# Patient Record
Sex: MALE | Race: WHITE | NOT HISPANIC OR LATINO | Employment: OTHER | URBAN - METROPOLITAN AREA
[De-identification: names, ages, dates, MRNs, and addresses within clinical notes are randomized per-mention and may not be internally consistent; named-entity substitution may affect disease eponyms.]

---

## 2017-01-04 PROBLEM — M21.371 RIGHT FOOT DROP: Status: ACTIVE | Noted: 2017-01-04

## 2017-01-09 ENCOUNTER — TELEPHONE (OUTPATIENT)
Dept: PODIATRY | Facility: CLINIC | Age: 72
End: 2017-01-09

## 2017-01-09 ENCOUNTER — TELEPHONE (OUTPATIENT)
Dept: NEUROLOGY | Facility: CLINIC | Age: 72
End: 2017-01-09

## 2017-01-09 NOTE — TELEPHONE ENCOUNTER
----- Message from Sera Nagy sent at 1/9/2017 10:20 AM CST -----  Patient is being referred for right foot(stated cannot lift right foot)needs soon appointment/please call patient back at [phone] to schedule or advise.

## 2017-01-10 NOTE — TELEPHONE ENCOUNTER
Spoke with patient, referral in the system. Patient placed on call list, will call back to schedule . Verbalized understanding.

## 2017-01-20 ENCOUNTER — TELEPHONE (OUTPATIENT)
Dept: NEUROLOGY | Facility: CLINIC | Age: 72
End: 2017-01-20

## 2017-01-26 ENCOUNTER — TELEPHONE (OUTPATIENT)
Dept: NEUROLOGY | Facility: CLINIC | Age: 72
End: 2017-01-26

## 2017-01-26 ENCOUNTER — OFFICE VISIT (OUTPATIENT)
Dept: PODIATRY | Facility: CLINIC | Age: 72
End: 2017-01-26
Payer: MEDICARE

## 2017-01-26 DIAGNOSIS — I73.9 PERIPHERAL VASCULAR DISEASE: ICD-10-CM

## 2017-01-26 DIAGNOSIS — M21.371 RIGHT FOOT DROP: Primary | ICD-10-CM

## 2017-01-26 PROCEDURE — 1159F MED LIST DOCD IN RCRD: CPT | Mod: S$GLB,,,

## 2017-01-26 PROCEDURE — 99999 PR PBB SHADOW E&M-EST. PATIENT-LVL II: CPT | Mod: PBBFAC,,,

## 2017-01-26 PROCEDURE — 1160F RVW MEDS BY RX/DR IN RCRD: CPT | Mod: S$GLB,,,

## 2017-01-26 PROCEDURE — 1126F AMNT PAIN NOTED NONE PRSNT: CPT | Mod: S$GLB,,,

## 2017-01-26 PROCEDURE — 99203 OFFICE O/P NEW LOW 30 MIN: CPT | Mod: S$GLB,,,

## 2017-01-26 PROCEDURE — 1157F ADVNC CARE PLAN IN RCRD: CPT | Mod: S$GLB,,,

## 2017-01-26 NOTE — TELEPHONE ENCOUNTER
Angella - can we please schedule him for the morning on Wed, Feb 1? I'm at [facility] but can be here first to see him.  Thanks  Ruiz

## 2017-01-26 NOTE — LETTER
January 26, 2017      SHANNON Osei Jr., MD  [address]  [address]  [facility] [address]           [facility] Podiat[facility] [address] [facility] [address] [place] [address]  Phone: [phone]          Patient: Robert Tai   MR Number: [id number]   YOB: 1945   Date of Visit: 1/26/2017       Dear Dr. SHANNON Osei Jr.:    Thank you for referring Robert Tai to me for evaluation. Attached you will find relevant portions of my assessment and plan of care.    If you have questions, please do not hesitate to call me. I look forward to following Robert Tai along with you.    Sincerely,    Dov Chapman, CINDI    Enclosure  CC:  No Recipients    If you would like to receive this communication electronically, please contact [email or web address] or [phone] to request more information on [employer] Link access.    For providers and/or their staff who would like to refer a patient to [facility], please contact us through our one-stop-shop provider referral line, [facility] , at [phone].    If you feel you have received this communication in error or would no longer like to receive these types of communications, please e-mail [email or web address]

## 2017-01-26 NOTE — TELEPHONE ENCOUNTER
----- Message from Dov Chapman DPM sent at 1/26/2017  2:54 PM CST -----  Patient was asking if he could be seen earlier than February 21. He has a right foot drop.  No trauma or CVA, denies LBP or other neurological history.  I have ordered emg/ncv, PT, AFO.    Dr. Chapman

## 2017-01-26 NOTE — MR AVS SNAPSHOT
[facility]  [address] [facility]  [place] [address]  Phone: [phone]                  Robert Tai   2017 2:15 PM   Office Visit    Description:  Male : 1945   Provider:  Dov Chapman DPM   Department:  [facility]           Reason for Visit     Foot Problem     Last PCP-visit           Diagnoses this Visit        Comments    Right foot drop    -  Primary            To Do List           Future Appointments        Provider Department Dept Phone    2017 1:00 PM Dov Hodges DO [facility] Neuorology [phone]    3/9/2017 9:00 AM Freddy Herrera MD [facility] - Physical Med/Rehab [phone]      Goals (5 Years of Data)     None      [facility]     [facility] On Call Nurse Care Line -  Assistance  Registered nurses in the [facility] On Call Center provide clinical advisement, health education, appointment booking, and other advisory services.  Call for this free service at [phone].             Medications           Message regarding Medications     Verify the changes and/or additions to your medication regime listed below are the same as discussed with your clinician today.  If any of these changes or additions are incorrect, please notify your healthcare provider.             Verify that the below list of medications is an accurate representation of the medications you are currently taking.  If none reported, the list may be blank. If incorrect, please contact your healthcare provider. Carry this list with you in case of emergency.           Current Medications     amiodarone (PACERONE) 200 MG Tab Take 1 tablet (200 mg total) by mouth once daily.    atorvastatin (LIPITOR) 40 MG tablet Take 40 mg by mouth once daily.    clopidogrel (PLAVIX) 75 mg tablet Take 1 tablet (75 mg total) by mouth once daily.    ELIQUIS 5 mg Tab TAKE 1 TABLET TWICE DAILY    furosemide (LASIX) 20 MG tablet Take 20 mg by mouth 2 (two) times daily.    hydrALAZINE  (APRESOLINE) 25 MG tablet Take 25 mg by mouth every 12 (twelve) hours.    isosorbide dinitrate (ISORDIL) 20 MG tablet Take 1 tablet (20 mg total) by mouth 2 (two) times daily.    metoprolol succinate (TOPROL-XL) 25 MG 24 hr tablet Take 1 tablet (25 mg total) by mouth every evening.    metoprolol succinate (TOPROL-XL) 50 MG 24 hr tablet Take 1 tablet (50 mg total) by mouth every morning.    montelukast (SINGULAIR) 10 mg tablet Take 10 mg by mouth every evening.     sertraline (ZOLOFT) 50 MG tablet Take 1 tablet (50 mg total) by mouth once daily.    tamsulosin (FLOMAX) 0.4 mg Cp24 TAKE 1 CAPSULE AT BEDTIME  FOR  PROSTATE           Clinical Reference Information           Allergies as of 1/26/2017     No Known Allergies      Immunizations Administered on Date of Encounter - 1/26/2017     None      Orders Placed During Today's Visit     Future Labs/Procedures Expected by Expires    EMG - 2 Extremities  As directed 1/26/2018    Nerve conduction test  As directed 1/26/2018      [facility] Sign-Up     Activating your [facility] account is as easy as 1-2-3!     1) Visit [email or web address], select Sign Up Now, enter this activation code and your date of birth, then select Next.  [email or web address]  Expires: 2/18/2017  3:37 PM      2) Create a username and password to use when you visit [facility] in the future and select a security question in case you lose your password and select Next.    3) Enter your e-mail address and click Sign Up!    Additional Information  If you have questions, please e-mail [email or web address] or call [phone] to talk to our [facility] staff. Remember, [facility] is NOT to be used for urgent needs. For medical emergencies, dial 911.

## 2017-01-29 PROBLEM — I73.9 PERIPHERAL VASCULAR DISEASE: Status: ACTIVE | Noted: 2017-01-29

## 2017-01-30 NOTE — PROGRESS NOTES
Subjective:       Patient ID: Robert Tai is a 71 y.o. male.    Chief Complaint: Foot Problem (Rt Drop foot , pt states that his leg also feels numb when he wakes up ) and Last PCP-visit (PCP: Farnaz 1/4/17 )    HPI  Patient presents with 6 weeks of weakness right foot, he can't lift the foot.  Onset was acute.  No trauma, previous knee replacement 5-6 years ago.  No other neurological problems.    Past Medical History   Diagnosis Date    Anticoagulant long-term use     Atrial fibrillation 1/20/2016    Cardiomyopathy 2/18/2016    CHF (congestive heart failure)     COPD (chronic obstructive pulmonary disease)     Coronary artery disease     Dyslipidemia 1/20/2016    Elevated diaphragm      elevated left hemidiaphragm noted since 2008    Essential hypertension 1/20/2016    Low kidney function     Obesity 1/20/2016    S/P CABG (coronary artery bypass graft) 1/20/2016     2008 at [facility] (LIMA to LAD, SVG to D1, jump SVG to OM1 and OM2, SVG to R PDA)       Review of Systems  ROS:  Constitution: Negative for chills, fever, weakness and malaise/fatigue.   HEENT: Negative for headaches.   Cardiovascular: Negative for chest pain and claudication.   Respiratory: Negative for cough and shortness of breath.   Musculoskeletal: Positive for foot pain.  Negative for muscle cramps and muscle weakness.   Gastrointestinal: Negative for nausea and vomiting.   Neurological: + for weakness to right foot extensors  Dermatological: Negative for wound.        Objective:      Physical Exam  Constitutional:  Patient is oriented to person, place, and time. Vital signs are normal.  Appears well-developed and well-nourished.     Vascular:  Dorsalis pedis pulses are 1+ on the right side, and 1+ on the left side.   Posterior tibial pulses are 1+ on the right side, and 1+ on the left side.   + digital hair growth, capillary fill time to all toes <3 seconds, no swelling    Skin/Dermatological:  Skin is warm and intact.  No cyanosis or  clubbing.  No rashes noted.  No open wounds.   Musculoskeletal:      Right foot drop, hip flexors intact, foot does clear the ground on gait exam.  Flexors/everters/inverters +5/5       Neurological:  No spasticity noted  + deficits to sharp/dull, light touch or vibratory sensation along the common peroneal nerve right foot  Patellar reflexes are 2+ on the right side and 2+ on the left side.  Achilles reflexes are 2+ on the right side and 2+ on the left side.          Assessment:       1. Right foot drop    2. Peripheral vascular disease        Plan:       Right foot drop  -     EMG - 2 Extremities; Future  -     Nerve conduction test; Future    Peripheral vascular disease      Patient has appointment to see neurology.  Appears to be injury to common peroneal and not an UMN lesion or muscular in origin.  HE will complete the EMG/NCV and I did order an AFO and referred him to PT.  We did discuss tendon transfers if all fails.  We also discussed risk factors of PVD and importance of proper diet and exercise.

## 2017-02-21 ENCOUNTER — OFFICE VISIT (OUTPATIENT)
Dept: NEUROLOGY | Facility: CLINIC | Age: 72
End: 2017-02-21
Payer: MEDICARE

## 2017-02-21 VITALS
WEIGHT: 198.63 LBS | HEART RATE: 53 BPM | HEIGHT: 71 IN | BODY MASS INDEX: 27.81 KG/M2 | DIASTOLIC BLOOD PRESSURE: 58 MMHG | SYSTOLIC BLOOD PRESSURE: 103 MMHG | RESPIRATION RATE: 17 BRPM

## 2017-02-21 DIAGNOSIS — M21.371 RIGHT FOOT DROP: ICD-10-CM

## 2017-02-21 DIAGNOSIS — M21.371 FOOT DROP, RIGHT: ICD-10-CM

## 2017-02-21 DIAGNOSIS — M21.379 FOOT-DROP, UNSPECIFIED LATERALITY: Primary | ICD-10-CM

## 2017-02-21 DIAGNOSIS — G57.01 COMPRESSION OF COMMON PERONEAL NERVE, RIGHT: ICD-10-CM

## 2017-02-21 DIAGNOSIS — G57.01 COMPRESSION OF COMMON PERONEAL NERVE, RIGHT: Primary | ICD-10-CM

## 2017-02-21 PROCEDURE — 3074F SYST BP LT 130 MM HG: CPT | Mod: S$GLB,,, | Performed by: PSYCHIATRY & NEUROLOGY

## 2017-02-21 PROCEDURE — 1157F ADVNC CARE PLAN IN RCRD: CPT | Mod: S$GLB,,, | Performed by: PSYCHIATRY & NEUROLOGY

## 2017-02-21 PROCEDURE — 1159F MED LIST DOCD IN RCRD: CPT | Mod: S$GLB,,, | Performed by: PSYCHIATRY & NEUROLOGY

## 2017-02-21 PROCEDURE — 99203 OFFICE O/P NEW LOW 30 MIN: CPT | Mod: S$GLB,,, | Performed by: PSYCHIATRY & NEUROLOGY

## 2017-02-21 PROCEDURE — 3078F DIAST BP <80 MM HG: CPT | Mod: S$GLB,,, | Performed by: PSYCHIATRY & NEUROLOGY

## 2017-02-21 PROCEDURE — 99999 PR PBB SHADOW E&M-EST. PATIENT-LVL IV: CPT | Mod: PBBFAC,,, | Performed by: PSYCHIATRY & NEUROLOGY

## 2017-02-21 PROCEDURE — 1126F AMNT PAIN NOTED NONE PRSNT: CPT | Mod: S$GLB,,, | Performed by: PSYCHIATRY & NEUROLOGY

## 2017-02-21 PROCEDURE — 1160F RVW MEDS BY RX/DR IN RCRD: CPT | Mod: S$GLB,,, | Performed by: PSYCHIATRY & NEUROLOGY

## 2017-02-21 RX ORDER — LISINOPRIL 20 MG/1
TABLET ORAL
COMMUNITY
Start: 2017-02-01 | End: 2017-04-11

## 2017-02-21 RX ORDER — FUROSEMIDE 40 MG/1
TABLET ORAL
COMMUNITY
Start: 2016-12-26 | End: 2017-07-17

## 2017-02-21 RX ORDER — ATORVASTATIN CALCIUM 80 MG/1
TABLET, FILM COATED ORAL
COMMUNITY
Start: 2016-12-13 | End: 2017-02-21

## 2017-02-21 NOTE — PROGRESS NOTES
Subjective:         Patient ID: Robert Tai is a right handed 72 y.o.  male who presents for right sided foot drop.     History of Present Illness    Reviewed clinic notes in [facility], has seen Dr. Osei and Dr. Chapman.  Suspected peripheral nerve injury to common peroneal nerve with foot drop and sensory loss.     Onset mid-November 2016. Had lost 70 lbs over the past year - control of CHF, also loss of appetite.  He first noted his shoe was catching when he walked on the right side, was subtle at onset.  Progressed gradually, had to be mindful of his walking.  No pain in the back, knee or ankle.  No recent trauma or surgery.  Keeps active with water aerobics.    Numbness over the past 6 weeks or so; in AM before walking around feels like shin splints, numbness in the anterior calves, not in the feet or toes.     No hx of stroke.  Has ICD, can not have MRI. Not aware of any peripheral vascular disease.    Review of patient's allergies indicates:  No Known Allergies  Current Outpatient Prescriptions   Medication Sig Dispense Refill    amiodarone (PACERONE) 200 MG Tab Take 1 tablet (200 mg total) by mouth once daily. 90 tablet 3    atorvastatin (LIPITOR) 40 MG tablet Take 40 mg by mouth once daily.      clopidogrel (PLAVIX) 75 mg tablet Take 1 tablet (75 mg total) by mouth once daily. 30 tablet 11    ELIQUIS 5 mg Tab TAKE 1 TABLET TWICE DAILY 180 tablet 1    furosemide (LASIX) 40 MG tablet       hydrALAZINE (APRESOLINE) 25 MG tablet Take 25 mg by mouth every 12 (twelve) hours.      isosorbide dinitrate (ISORDIL) 20 MG tablet Take 1 tablet (20 mg total) by mouth 2 (two) times daily. 60 tablet 11    lisinopril (PRINIVIL,ZESTRIL) 20 MG tablet       metoprolol succinate (TOPROL-XL) 50 MG 24 hr tablet Take 1 tablet (50 mg total) by mouth every morning. 90 tablet 1    montelukast (SINGULAIR) 10 mg tablet Take 10 mg by mouth every evening.       sertraline (ZOLOFT) 50 MG tablet Take 1 tablet (50 mg total) by  mouth once daily. 90 tablet 3    tamsulosin (FLOMAX) 0.4 mg Cp24 TAKE 1 CAPSULE AT BEDTIME  FOR  PROSTATE 90 capsule 3     No current facility-administered medications for this visit.        Past Medical History  Past Medical History   Diagnosis Date    Anticoagulant long-term use     Atrial fibrillation 1/20/2016    Cardiomyopathy 2/18/2016    CHF (congestive heart failure)     COPD (chronic obstructive pulmonary disease)     Coronary artery disease     Dyslipidemia 1/20/2016    Elevated diaphragm      elevated left hemidiaphragm noted since 2008    Essential hypertension 1/20/2016    Low kidney function     Obesity 1/20/2016    S/P CABG (coronary artery bypass graft) 1/20/2016     2008 at [facility] (LIMA to LAD, SVG to D1, jump SVG to OM1 and OM2, SVG to R PDA)       Past Surgical History  Past Surgical History   Procedure Laterality Date    Cardiac surgery      Coronary artery bypass graft  11/07/2008     5 vessel CABG    Appendectomy      Joint replacement Right      knee    Coronary stent placement  04/26/2016     OBED to LCX       Family History  Family History   Problem Relation Age of Onset    Heart disease Father     Heart attack Father     Heart disease Brother     Heart disease Brother     Heart attack Brother        Social History  Social History     Social History    Marital status:      Spouse name: N/A    Number of children: N/A    Years of education: N/A     Occupational History    Not on file.     Social History Main Topics    Smoking status: Former Smoker     Packs/day: 1.00     Years: 15.00     Quit date: 1/1/1986    Smokeless tobacco: Never Used    Alcohol use No    Drug use: No    Sexual activity: Not on file     Other Topics Concern    Not on file     Social History Narrative        Review of Systems  Review of Systems   Constitutional: Negative for appetite change, fatigue, fever and unexpected weight change.   HENT: Negative for congestion, hearing loss,  nosebleeds, sinus pressure and trouble swallowing.    Eyes: Negative for pain and visual disturbance.   Respiratory: Negative for cough, shortness of breath and wheezing.    Cardiovascular: Negative for chest pain and palpitations.   Gastrointestinal: Negative for abdominal pain, blood in stool, diarrhea, nausea and vomiting.   Endocrine: Negative for cold intolerance and heat intolerance.   Genitourinary: Negative for difficulty urinating, hematuria and urgency.   Musculoskeletal: Negative for arthralgias, back pain, gait problem, myalgias and neck pain.   Skin: Negative for rash and wound.   Neurological: Positive for weakness and numbness. Negative for dizziness and seizures.   Hematological: Bruises/bleeds easily.   Psychiatric/Behavioral: Negative for decreased concentration, dysphoric mood and sleep disturbance.       Objective:        Vitals:    17 1258   BP: (!) 103/58   Pulse: (!) 53   Resp: 17     Body mass index is 27.7 kg/(m^2).  Neurologic Exam     Motor Exam   Muscle bulk: normal  Overall muscle tone: normal  Right arm pronator drift: absent  Left arm pronator drift: absent    Strength   Right iliopsoas: 5/5  Left iliopsoas: 5/5  Right quadriceps: 5/5  Left quadriceps: 5/5  Right hamstrin/5  Left hamstrin/5  Right glutei: 5/5  Left glutei: 5/5  Right anterior tibial: 4/5  Left anterior tibial: 5/5  Right posterior tibial: 5/5  Left posterior tibial: 5/5  Right peroneal: 4/5  Left peroneal: 5/5  Right gastroc: 5/5  Left gastroc: 5/5       -4/5 weakness right ankle dorsiflexion, 3/5 toe extension, 3/5 ankle eversion  Normal strength inversion R ankle     Sensory Exam   Light touch normal.   Sensory deficit distribution on right: common peroneal       Diminished pin in right common peroneal distribution from distal tibia down     Gait, Coordination, and Reflexes     Reflexes   Right patellar: 2+  Left patellar: 2+  Right achilles: 2+  Left achilles: 2+      Physical Exam   Neurological:    Reflex Scores:       Patellar reflexes are 2+ on the right side and 2+ on the left side.       Achilles reflexes are 2+ on the right side and 2+ on the left side.        Data Review:       Assessment:       1. Compression of common peroneal nerve, right    2. Right foot drop        Ideally would like to get MRI of the right leg and knee to assess for compressive lesion such as Baker cyst in the popliteal fossa, ganglion cyst or schwannoma compressing the common peroneal nerve.  As he has an ICD this is not an option, will try ultrasound and if necessary CT for soft tissues.  Follow-up on electrodiagnostic studies.  Inflammatory markers for possible vasculitic process    Plan:       Compression of common peroneal nerve, right  -     Sedimentation rate, manual; Future; Expected date: 2/21/17  -     KATHYA; Future; Expected date: 2/21/17  -     C-reactive protein; Future; Expected date: 2/21/17  -     Rheum Musculoskeletal US (Non-Viewpoint)    Right foot drop      Return in about 6 weeks (around 4/4/2017).        Patient information shared at the time of visit:  Peroneal nerve compression - I would encourage you to go ahead and get the custom fit brace (ankle foot orthotic) to prevent falls and injury to the leg.  Nerve conduction study and EMG will tell us more precisely where in the nerve and how severe the injury is.    Recent weight loss likely had a role in this process.  Make sure to avoid crossing your legs - also avoid siting in a prolonged squatting position.     Thank you very much for the opportunity to assist in this patient's care.  If you have any questions or concerns, please do not hesitate to contact me at any time.     Sincerely,  Dov Hodges, DO

## 2017-02-21 NOTE — PATIENT INSTRUCTIONS
Peroneal nerve compression - I would encourage you to go ahead and get the custom fit brace (ankle foot orthotic) to prevent falls and injury to the leg.  Nerve conduction study and EMG will tell us more precisely where in the nerve and how severe the injury is.    Recent weight loss likely had a role in this process.  Make sure to avoid crossing your legs - also avoid siting in a prolonged squatting position.

## 2017-02-21 NOTE — MR AVS SNAPSHOT
[facility] Neurology  [address] [facility] [address] [place] [address]  Phone: [phone]  Fax: [phone]                  Robert Tai   2017 1:00 PM   Office Visit    Description:  Male : 1945   Provider:  Dov Hodges DO   Department:  [facility] Neurology           Reason for Visit     foot drop           Diagnoses this Visit        Comments    Compression of common peroneal nerve, right    -  Primary     Right foot drop                To Do List           Future Appointments        Provider Department Dept Phone    3/9/2017 9:00 AM Freddy Herrera MD [facility] Physical Med/Rehab [phone]    2017 2:20 PM Dov Hodges DO [facility] Neurology [phone]      Goals (5 Years of Data)     None      Follow-Up and Disposition     Return in about 6 weeks (around 2017).      [facility]     [facility] Nurse [facility] Line -  Assistance  Registered nurses in the [facility] On Call Center provide clinical advisement, health education, appointment booking, and other advisory services.  Call for this free service at [phone].             Medications           Message regarding Medications     Verify the changes and/or additions to your medication regime listed below are the same as discussed with your clinician today.  If any of these changes or additions are incorrect, please notify your healthcare provider.             Verify that the below list of medications is an accurate representation of the medications you are currently taking.  If none reported, the list may be blank. If incorrect, please contact your healthcare provider. Carry this list with you in case of emergency.           Current Medications     amiodarone (PACERONE) 200 MG Tab Take 1 tablet (200 mg total) by mouth once daily.    atorvastatin (LIPITOR) 40 MG tablet Take 40 mg by mouth once daily.    clopidogrel (PLAVIX) 75 mg tablet Take 1 tablet (75 mg total) by mouth once daily.    ELIQUIS 5  "mg Tab TAKE 1 TABLET TWICE DAILY    furosemide (LASIX) 40 MG tablet     hydrALAZINE (APRESOLINE) 25 MG tablet Take 25 mg by mouth every 12 (twelve) hours.    isosorbide dinitrate (ISORDIL) 20 MG tablet Take 1 tablet (20 mg total) by mouth 2 (two) times daily.    lisinopril (PRINIVIL,ZESTRIL) 20 MG tablet     metoprolol succinate (TOPROL-XL) 25 MG 24 hr tablet Take 1 tablet (25 mg total) by mouth every evening.    metoprolol succinate (TOPROL-XL) 50 MG 24 hr tablet Take 1 tablet (50 mg total) by mouth every morning.    montelukast (SINGULAIR) 10 mg tablet Take 10 mg by mouth every evening.     sertraline (ZOLOFT) 50 MG tablet Take 1 tablet (50 mg total) by mouth once daily.    tamsulosin (FLOMAX) 0.4 mg Cp24 TAKE 1 CAPSULE AT BEDTIME  FOR  PROSTATE           Clinical Reference Information           Your Vitals Were     BP Pulse Resp Height Weight BMI    103/58 (BP Location: Right arm, Patient Position: Sitting, BP Method: Automatic) 53 17 5' 11" (1.803 m) 90.1 kg (198 lb 10.2 oz) 27.7 kg/m2      Blood Pressure          Most Recent Value    BP  (!)  103/58      Allergies as of 2/21/2017     No Known Allergies      Immunizations Administered on Date of Encounter - 2/21/2017     None      Orders Placed During Today's Visit      Normal Orders This Visit    Rheum Musculoskeletal US (Non-Viewpoint)     Future Labs/Procedures Expected by Expires    KATHYA  2/21/2017 4/22/2018    C-reactive protein  2/21/2017 4/22/2018    Sedimentation rate, manual  2/21/2017 4/22/2018      Instructions    Peroneal nerve compression - I would encourage you to go ahead and get the custom fit brace (ankle foot orthotic) to prevent falls and injury to the leg.  Nerve conduction study and EMG will tell us more precisely where in the nerve and how severe the injury is.    Recent weight loss likely had a role in this process.  Make sure to avoid crossing your legs - also avoid siting in a prolonged squatting position.            Language Assistance " Services     ATTENTION: Language assistance services are available, free of charge. Please call [phone].      ATENCIÓN: Si habla julianne, tiene a chandler disposición servicios gratuitos de asistencia lingüística. Llame al [phone].     CHÚ Ý: N?u b?n nói Ti?ng Vi?t, có các d?ch v? h? tr? ngôn ng? mi?n phí dành cho b?n. G?i s? [phone].         [facility] complies with applicable Federal civil rights laws and does not discriminate on the basis of race, color, national origin, age, disability, or sex.

## 2017-02-21 NOTE — LETTER
February 23, 2017      SHANNON Osei Jr., MD  [address]  [facility] [address]           [facility] Neurology  [address] [place] [address]  Phone: [phone]  Fax: [phone]          Patient: Robert Tai   MR Number: [id number]   YOB: 1945   Date of Visit: 2/21/2017       Dear Dr. SHANNON Osei Jr.:    Thank you for referring Robert Tai to me for evaluation. Attached you will find relevant portions of my assessment and plan of care.    If you have questions, please do not hesitate to call me. I look forward to following Robert Tai along with you.    Sincerely,    Dov Hodges, DO    Enclosure  CC:  No Recipients    If you would like to receive this communication electronically, please contact [email or web address] or [phone] to request more information on [employer] Link access.    For providers and/or their staff who would like to refer a patient to [facility], please contact us through our one-stop-shop provider referral line, [facility], at [phone].    If you feel you have received this communication in error or would no longer like to receive these types of communications, please e-mail [email or web address]

## 2017-02-24 ENCOUNTER — HOSPITAL ENCOUNTER (OUTPATIENT)
Dept: RADIOLOGY | Facility: HOSPITAL | Age: 72
Discharge: HOME | End: 2017-02-24
Attending: PSYCHIATRY & NEUROLOGY
Payer: MEDICARE

## 2017-02-24 DIAGNOSIS — M21.371 FOOT DROP, RIGHT: ICD-10-CM

## 2017-02-24 DIAGNOSIS — G57.01 COMPRESSION OF COMMON PERONEAL NERVE, RIGHT: ICD-10-CM

## 2017-02-24 PROCEDURE — 76882 US LMTD JT/FCL EVL NVASC XTR: CPT | Mod: 26,RT,, | Performed by: RADIOLOGY

## 2017-02-24 PROCEDURE — 76882 US LMTD JT/FCL EVL NVASC XTR: CPT | Mod: TC,PO,RT

## 2017-03-09 ENCOUNTER — OFFICE VISIT (OUTPATIENT)
Dept: PHYSICAL MEDICINE AND REHAB | Facility: CLINIC | Age: 72
End: 2017-03-09
Payer: MEDICARE

## 2017-03-09 DIAGNOSIS — G60.8 PERIPHERAL SENSORY-MOTOR AXONAL POLYNEUROPATHY: ICD-10-CM

## 2017-03-09 DIAGNOSIS — M21.371 RIGHT FOOT DROP: ICD-10-CM

## 2017-03-09 DIAGNOSIS — G57.01 COMMON PERONEAL NEUROPATHY, RIGHT: Primary | ICD-10-CM

## 2017-03-09 PROCEDURE — 99499 UNLISTED E&M SERVICE: CPT | Mod: S$GLB,,, | Performed by: PHYSICAL MEDICINE & REHABILITATION

## 2017-03-09 PROCEDURE — 95911 NRV CNDJ TEST 9-10 STUDIES: CPT | Mod: 26,,, | Performed by: PHYSICAL MEDICINE & REHABILITATION

## 2017-03-09 PROCEDURE — 95886 MUSC TEST DONE W/N TEST COMP: CPT | Mod: 26,RT,, | Performed by: PHYSICAL MEDICINE & REHABILITATION

## 2017-03-09 NOTE — LETTER
March 13, 2017      Dov Chapman DPM  [address] [facility] [address] [place] [address]           [facility]  [address] [facility] [address] [place] [address]  Phone: [phone]  Fax: [phone]          Patient: Robert Tai   MR Number: [id number]   YOB: 1945   Date of Visit: 3/9/2017       Dear Dr. Dov Chapman:    Thank you for referring Robert Tai to me for evaluation. Attached you will find relevant portions of my assessment and plan of care.    If you have questions, please do not hesitate to call me. I look forward to following Robert Tai along with you.    Sincerely,    Freddy Herrera MD    Enclosure  CC:  No Recipients    If you would like to receive this communication electronically, please contact [email or web address] or [phone] to request more information on [employer] Link access.    For providers and/or their staff who would like to refer a patient to [facility], please contact us through our one-stop-shop provider referral line, [facility], at [phone].    If you feel you have received this communication in error or would no longer like to receive these types of communications, please e-mail [email or web address]

## 2017-03-13 NOTE — PROGRESS NOTES
[facility]  [address] [facility]  [place] [address]             Full Name: YG RESENDIZ Gender: Male  Patient ID: [id number] YOB: 1945  History: Pt reports right sided foot drop a few months ago. He notes a 70 lb weight loss of the last year.      Visit Date: 3/9/2017 08:59  Age: 72 Years 1 Months Old  Examining Physician: MAYTE  Referring Physician: CHRISTIAN      Sensory NCS      Nerve / Sites Rec. Site Onset Lat Peak Lat NP Amp PP Amp Segments Distance Peak Diff Velocity     ms ms µV µV  cm ms m/s   L Ulnar - Digit V (Antidromic)      Wrist Dig V 3.28 4.06 4.7 3.6 Wrist - Dig V 14  43      B.Elbow Dig V 3.44 4.22 3.8 2.2 B.Elbow - Wrist  0.16    L Sural - Ankle (Calf)      Calf Ankle 4.79 5.26 5.0 4.1 Calf - Ankle 14  29      2 Ankle 4.43 4.90 0.99 6.6          3 Ankle 4.64 5.26 5.2 2.1       R Sural - Ankle (Calf)      Calf Ankle 3.59 4.22 6.7 13.1 Calf - Ankle 14  39      2 Ankle 3.75 4.48 3.9 5.1       L Superficial peroneal - Ankle      Lat leg Ankle 3.02 3.33 13.7 8.2 Lat leg - Ankle 14  46      2 Ankle 3.75 4.01 7.6 2.1       R Superficial peroneal - Ankle      Lat leg Ankle NR NR NR NR Lat leg - Ankle 14  NR       Motor NCS      Nerve / Sites Muscle Latency Amplitude Amp % Duration Segments Distance Lat Diff Velocity     ms mV % ms  cm ms m/s   L Peroneal - EDB      Ankle EDB 7.71 1.0 100 5.78 Ankle - EDB 8        Fib head EDB 15.73 1.1 112  Fib head - Ankle 32 8.02 40   R Peroneal - EDB      Ankle EDB 5.26 4.4 100 6.82 Ankle - EDB 8        Fib head EDB 14.22 3.2 74.4 7.55 Fib head - Ankle 35 8.96 39      Pop fossa EDB 16.61 3.3 74.9 7.45 Pop fossa - Fib head 12 2.40 50   L Tibial - AH      Ankle AH 4.64 11.1 100 6.82 Ankle - AH 8        Pop fossa AH 15.57 7.9 71.6 8.54 Pop fossa - Ankle 44 10.94 40   R Tibial - AH      Ankle AH 5.94 10.2 100 6.09 Ankle - AH 8        Pop fossa AH 15.42 8.6 83.9  Pop fossa - Ankle 42 9.48 44   L Peroneal - Tib Ant      Fib Head Tib Ant 3.44 2.1 100  5.16 Fib Head - Tib Ant 9        Pop fossa Tib Ant 4.79 1.6 74.5 4.43 Pop fossa - Fib Head 12 1.35 89   R Peroneal - Tib Ant      Fib Head Tib Ant 3.49 1.1 100 4.69 Fib Head - Tib Ant         Pop fossa Tib Ant 12.60 2.6 243 4.69 Pop fossa - Fib Head 13 9.11 14       EMG         EMG Summary Table     Spontaneous MUAP Recruitment   Muscle IA Fib PSW Fasc H.F. Amp Dur. PPP Pattern   R. Vastus medialis N None None None None N N N N   R. Tibialis anterior N 1+ 2+ None None N N N Reduced   R. Peroneus longus N None 1+ None None N N N Reduced   R. Gastrocnemius (Medial head) N None None None None N N N N   R. Biceps femoris (short head) N None None None None N N N N   R. Gluteus medius N None None None None N N N N   R. Gluteus gosia N None None None None N N N N   R. Lumbar paraspinals N None None None None       R. Extensor digitorum brevis N None None None None N N N N       Summary    The motor conduction test was performed on 6 nerve(s). The results were normal in 3 nerve(s): R Peroneal - EDB, L Tibial - AH, R Tibial - AH. Findings were unremarkable in 2 nerve(s): L Peroneal - Tib Ant, R Peroneal - Tib Ant. Results outside the specified normal range were found in 1 nerve(s), as follows:   In the L Peroneal - EDB study  o the take off latency result was increased for Ankle stimulation  o the peak amplitude result was reduced for Ankle stimulation    The sensory conduction test was performed on 5 nerve(s). The results were normal in 1 nerve(s): L Superficial peroneal - Ankle. Results outside the specified normal range were found in 4 nerve(s), as follows:   In the L Ulnar - Digit V (Antidromic) study  o the peak latency result was increased for Wrist stimulation  o the peak amplitude result was reduced for Wrist stimulation   In the L Sural - Ankle (Calf) study  o the peak latency result was increased for Calf stimulation   In the R Sural - Ankle (Calf) study  o the peak latency result was increased for Calf  stimulation   In the R Superficial peroneal - Ankle study  o the response was considered absent for Lat leg stimulation    The needle EMG examination was performed in 9 muscles. It was normal in 7 muscle(s): R. Vastus medialis, R. Gastrocnemius (Medial head), R. Biceps femoris (short head), R. Gluteus medius, R. Gluteus gosia, R. Lumbar paraspinals, R. Extensor digitorum brevis. The study was abnormal in 2 muscle(s), with the following distribution:   Abnormal spontaneous/insertional activity was found in R. Tibialis anterior, R. Peroneus longus.   Abnormal interference pattern was found in R. Tibialis anterior, R. Peroneus longus.      Electrodiagnostic Impression:  1. There was electrophysiologic evidence of a common peroneal neuropathy at the fibular neck on the right side, that is both demyelinating and axonal in nature.  2. There was also electrodiagnostic evidence of distal, sensorimotor polyneuropathy.  3. It is noted that the peroneal motor nerve response on the asymptomatic left side was paradoxically more abnormal than his symptomatic right side response.  This could be explained by technical error and/or his concurrent peripheral polyneuropathy.  Prognostic interpretation of todays results is somewhat difficult to make based on this concurrent peripheral polyneuropathy and paradoxical abnormal response on the left side.  However, the fact that the right peroneal motor amplitude response was well within normal limits and significantly increased in comparison to his asymptomatic side is certainly favorable.  4. There is no electrodiagnostic evidence of myopathy or lumbosacral radiculopathy/plexopathy of the right lower extremity.    Summary:  1. 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  2. Sensorimotor peripheral polyneuropathy.    Recommendations:  Todays test results will be sent to his referring physicians, Juan Pablo Chapman and Tracie for further review and direction in  treatment.    Thank you very much for the referral. Please call if you have any questions regarding this study or the report.       -------------------------------  Freddy Herrera M.D.

## 2017-03-16 ENCOUNTER — TELEPHONE (OUTPATIENT)
Dept: PODIATRY | Facility: CLINIC | Age: 72
End: 2017-03-16

## 2017-03-16 NOTE — TELEPHONE ENCOUNTER
----- Message from Chapis Kenny sent at 3/15/2017  3:57 PM CDT -----  Contact: [facility]  Faxed over request for order for orthotic brace and clinic notes on 3/8 and hasn't received anything back.  Please call back at  op 2 fax .

## 2017-03-24 ENCOUNTER — TELEPHONE (OUTPATIENT)
Dept: NEUROLOGY | Facility: CLINIC | Age: 72
End: 2017-03-24

## 2017-03-24 NOTE — PROGRESS NOTES
Reviewed EMG and NCS results.  Does not appear there is any ongoing compression on the nerve, continue wearing brace for foot drop and would wear protective cushioning (like a loose ACE Wrap) around the knee to prevent further pressure on the outside of the right knee. Over time we should see this heal and improve.

## 2017-04-05 ENCOUNTER — TELEPHONE (OUTPATIENT)
Dept: NEUROLOGY | Facility: CLINIC | Age: 72
End: 2017-04-05

## 2017-04-06 ENCOUNTER — TELEPHONE (OUTPATIENT)
Dept: NEUROLOGY | Facility: CLINIC | Age: 72
End: 2017-04-06

## 2017-04-07 NOTE — TELEPHONE ENCOUNTER
----- Message from Tiago Marcus sent at 4/6/2017  3:33 PM CDT -----  Contact: self   Placed call to pod, patient missed call from your office please call back at [phone] (home)

## 2017-05-10 ENCOUNTER — OFFICE VISIT (OUTPATIENT)
Dept: NEUROLOGY | Facility: CLINIC | Age: 72
End: 2017-05-10
Payer: MEDICARE

## 2017-05-10 VITALS
BODY MASS INDEX: 27.47 KG/M2 | HEART RATE: 76 BPM | HEIGHT: 71 IN | WEIGHT: 196.19 LBS | RESPIRATION RATE: 18 BRPM | SYSTOLIC BLOOD PRESSURE: 120 MMHG | DIASTOLIC BLOOD PRESSURE: 60 MMHG

## 2017-05-10 DIAGNOSIS — G57.01 COMPRESSION OF COMMON PERONEAL NERVE, RIGHT: Primary | ICD-10-CM

## 2017-05-10 PROCEDURE — 1160F RVW MEDS BY RX/DR IN RCRD: CPT | Mod: S$GLB,,, | Performed by: PSYCHIATRY & NEUROLOGY

## 2017-05-10 PROCEDURE — 99999 PR PBB SHADOW E&M-EST. PATIENT-LVL III: CPT | Mod: PBBFAC,,, | Performed by: PSYCHIATRY & NEUROLOGY

## 2017-05-10 PROCEDURE — 3074F SYST BP LT 130 MM HG: CPT | Mod: S$GLB,,, | Performed by: PSYCHIATRY & NEUROLOGY

## 2017-05-10 PROCEDURE — 99213 OFFICE O/P EST LOW 20 MIN: CPT | Mod: S$GLB,,, | Performed by: PSYCHIATRY & NEUROLOGY

## 2017-05-10 PROCEDURE — 3078F DIAST BP <80 MM HG: CPT | Mod: S$GLB,,, | Performed by: PSYCHIATRY & NEUROLOGY

## 2017-05-10 PROCEDURE — 1159F MED LIST DOCD IN RCRD: CPT | Mod: S$GLB,,, | Performed by: PSYCHIATRY & NEUROLOGY

## 2017-05-10 PROCEDURE — 1126F AMNT PAIN NOTED NONE PRSNT: CPT | Mod: S$GLB,,, | Performed by: PSYCHIATRY & NEUROLOGY

## 2017-05-10 PROCEDURE — 1157F ADVNC CARE PLAN IN RCRD: CPT | Mod: S$GLB,,, | Performed by: PSYCHIATRY & NEUROLOGY

## 2017-05-10 NOTE — PROGRESS NOTES
Subjective:         Patient ID: Robert Tai is a 72 y.o.  male who presents for follow up of right peroneal neuropathy and foot drop    History of Present Illness    Onset November 2016 following significant weight loss.  At last visit in February 2017, right ankle dorsiflexion -4/5, toe extension 3/5, ankle eversion 3/5.  Normal inversion 5/5.    No compressive source identified on ultrasound.  Electrodiagnostic studies significant for common peroneal neuropathy at the fibular neck on the right side, both demyelinating and axonal in nature as well as a superimposed distal sensorimotor polyneuropathy.    Doing water aerobics 5 days a week.  Stumbling less often.  Occasional numbness on both ankles in the AM.    Review of patient's allergies indicates:  No Known Allergies  Current Outpatient Prescriptions   Medication Sig Dispense Refill    amiodarone (PACERONE) 200 MG Tab TAKE 1 TABLET ONE TIME DAILY 90 tablet 3    atorvastatin (LIPITOR) 80 MG tablet TAKE 1 TABLET AT BEDTIME FOR CHOLESTEROL 90 tablet 3    clopidogrel (PLAVIX) 75 mg tablet TAKE 1 TABLET ONE TIME DAILY 90 tablet 11    ELIQUIS 5 mg Tab TAKE 1 TABLET TWICE DAILY 180 tablet 3    furosemide (LASIX) 40 MG tablet       hydrALAZINE (APRESOLINE) 25 MG tablet TAKE 1 TABLET EVERY 12 HOURS 180 tablet 3    isosorbide dinitrate (ISORDIL) 20 MG tablet TAKE 1 TABLET TWICE DAILY 180 tablet 3    metoprolol succinate (TOPROL-XL) 50 MG 24 hr tablet Take 1 tablet (50 mg total) by mouth every morning. 90 tablet 3    montelukast (SINGULAIR) 10 mg tablet Take 10 mg by mouth every evening.       sertraline (ZOLOFT) 50 MG tablet TAKE 1 TABLET EVERY DAY 90 tablet 3    tamsulosin (FLOMAX) 0.4 mg Cp24 TAKE 1 CAPSULE AT BEDTIME  FOR  PROSTATE 90 capsule 3     No current facility-administered medications for this visit.          Review of Systems  Review of Systems    Objective:        Vitals:    05/10/17 1438   BP: 120/60   Pulse: 76   Resp: 18     Body mass index  is 27.37 kg/(m^2).  Neurologic Exam     Mental Status   Oriented to person, place, and time.   Level of consciousness: alert    Motor Exam   Muscle bulk: normal  Overall muscle tone: normal  Right arm pronator drift: absent  Left arm pronator drift: absent    Strength   Right iliopsoas: 5/5  Left iliopsoas: 5/5  Right quadriceps: 5/5  Left quadriceps: 5/5  Right hamstrin/5  Left hamstrin/5  Right glutei: 5/5  Left glutei: 5/5  Right anterior tibial: 4/5  Left anterior tibial: 5/5  Right posterior tibial: 5/5  Left posterior tibial: 5/5  Right peroneal: 4/5  Left peroneal: 5/5  Right gastroc: 5/5  Left gastroc: 5/5       +4/5 weakness right ankle dorsiflexion, +4/5 toe extension, 4+/5 ankle eversion  Normal strength inversion R ankle     Sensory Exam   Light touch normal.   Sensory deficit distribution on right: common peroneal       Improved pin sensation; slightly decreased lateral ankle to pin.  Intact pin in webspace of 1st and 2nd toes, intact vibration at the great toe     Gait, Coordination, and Reflexes     Gait  Gait: normal    Coordination   Romberg: negative    Reflexes   Right patellar: 2+  Left patellar: 2+  Right achilles: 2+  Left achilles: 2+       Normal stride length, no steppage or circumduction       Physical Exam   Constitutional: He is oriented to person, place, and time. He appears well-developed and well-nourished.   HENT:   Head: Normocephalic and atraumatic.   Neurological: He is oriented to person, place, and time. He has a normal Romberg Test. Gait normal.   Reflex Scores:       Patellar reflexes are 2+ on the right side and 2+ on the left side.       Achilles reflexes are 2+ on the right side and 2+ on the left side.  Psychiatric: He has a normal mood and affect. His behavior is normal. Judgment and thought content normal.         Data Review:    Assessment:        1. Compression of common peroneal nerve, right       Significant improvement since her last visit.  Anticipate full  recovery.  Encouraged him to continue with regular exercise, avoid crossing his legs      Plan:         Problem List Items Addressed This Visit     None      Visit Diagnoses     Compression of common peroneal nerve, right    -  Primary          Return in about 6 months (around 11/10/2017).     Advised him if in 6 months, we call and he is back to baseline or has no concerns about this we can just check in as needed.     Thank you very much for the opportunity to assist in this patient's care.  If you have any questions or concerns, please do not hesitate to contact me at any time.     Sincerely,  Dov Hodges, DO

## 2018-10-12 PROBLEM — N17.9 AKI (ACUTE KIDNEY INJURY): Status: ACTIVE | Noted: 2018-10-12

## 2018-10-12 PROBLEM — S72.001A CLOSED RIGHT HIP FRACTURE: Status: ACTIVE | Noted: 2018-10-12

## 2018-10-12 PROBLEM — S72.141A: Status: ACTIVE | Noted: 2018-10-12

## 2018-10-12 PROBLEM — R55 SYNCOPE: Status: ACTIVE | Noted: 2018-10-12

## 2018-10-13 PROBLEM — R40.20 LOC (LOSS OF CONSCIOUSNESS): Status: ACTIVE | Noted: 2018-10-12

## 2018-10-13 PROBLEM — E87.5 HYPERKALEMIA: Status: ACTIVE | Noted: 2018-10-13

## 2018-10-13 PROBLEM — I95.9 HYPOTENSION: Status: ACTIVE | Noted: 2018-10-13

## 2018-10-14 PROBLEM — D64.9 POSTOPERATIVE ANEMIA: Status: ACTIVE | Noted: 2018-10-14

## 2018-10-16 PROBLEM — S70.11XA HEMATOMA OF RIGHT THIGH: Status: ACTIVE | Noted: 2018-10-16

## 2018-10-17 PROBLEM — R30.0 DYSURIA: Status: ACTIVE | Noted: 2018-10-17

## 2018-10-18 PROBLEM — E87.6 HYPOKALEMIA: Status: ACTIVE | Noted: 2018-10-18

## 2018-10-18 PROBLEM — R33.9 URINARY RETENTION: Status: ACTIVE | Noted: 2018-10-18

## 2019-01-15 PROBLEM — E87.6 HYPOKALEMIA: Status: RESOLVED | Noted: 2018-10-18 | Resolved: 2019-01-15

## 2019-01-15 PROBLEM — E87.5 HYPERKALEMIA: Status: RESOLVED | Noted: 2018-10-13 | Resolved: 2019-01-15

## 2019-01-15 PROBLEM — I95.1 ORTHOSTATIC HYPOTENSION: Status: ACTIVE | Noted: 2018-10-13

## 2019-01-19 PROBLEM — R09.02 HYPOXIA: Status: ACTIVE | Noted: 2019-01-19

## 2019-01-19 PROBLEM — N17.9 ACUTE KIDNEY INJURY SUPERIMPOSED ON CHRONIC KIDNEY DISEASE: Status: ACTIVE | Noted: 2019-01-19

## 2019-01-19 PROBLEM — R74.8 ELEVATED LIVER ENZYMES: Status: ACTIVE | Noted: 2019-01-19

## 2019-01-19 PROBLEM — I50.30 CONGESTIVE HEART FAILURE WITH LEFT VENTRICULAR DIASTOLIC DYSFUNCTION: Status: ACTIVE | Noted: 2019-01-19

## 2019-01-19 PROBLEM — N18.9 ACUTE KIDNEY INJURY SUPERIMPOSED ON CHRONIC KIDNEY DISEASE: Status: ACTIVE | Noted: 2019-01-19

## 2019-01-20 PROBLEM — R09.02 HYPOXIA: Status: ACTIVE | Noted: 2019-01-20

## 2019-01-20 PROBLEM — S51.011A SKIN TEAR OF RIGHT ELBOW WITHOUT COMPLICATION: Status: ACTIVE | Noted: 2019-01-20

## 2019-03-08 PROBLEM — Z86.79 S/P ABLATION OF ATRIAL FIBRILLATION: Status: ACTIVE | Noted: 2019-03-08

## 2019-03-08 PROBLEM — Z98.890 S/P ABLATION OF ATRIAL FIBRILLATION: Status: ACTIVE | Noted: 2019-03-08

## 2019-03-25 PROBLEM — I50.9 ACUTE ON CHRONIC CONGESTIVE HEART FAILURE: Status: ACTIVE | Noted: 2019-03-25

## 2019-03-26 PROBLEM — I50.43 ACUTE ON CHRONIC COMBINED SYSTOLIC AND DIASTOLIC HEART FAILURE: Status: ACTIVE | Noted: 2019-01-19

## 2019-03-26 PROBLEM — I50.43 ACUTE ON CHRONIC COMBINED SYSTOLIC AND DIASTOLIC CONGESTIVE HEART FAILURE: Status: ACTIVE | Noted: 2019-03-25

## 2019-03-26 PROBLEM — G90.9 AUTONOMIC DYSFUNCTION: Status: ACTIVE | Noted: 2019-03-26

## 2019-03-26 PROBLEM — J96.01 ACUTE HYPOXEMIC RESPIRATORY FAILURE: Status: ACTIVE | Noted: 2019-03-26

## 2019-04-09 PROBLEM — I27.20 MODERATE TO SEVERE PULMONARY HYPERTENSION: Status: ACTIVE | Noted: 2019-04-09

## 2019-04-13 PROBLEM — K59.00 CONSTIPATION: Status: ACTIVE | Noted: 2019-04-13

## 2019-04-15 PROBLEM — K59.00 CONSTIPATION: Status: RESOLVED | Noted: 2019-04-13 | Resolved: 2019-04-15

## 2019-04-15 PROBLEM — I27.20 MODERATE TO SEVERE PULMONARY HYPERTENSION: Status: RESOLVED | Noted: 2019-04-09 | Resolved: 2019-04-15

## 2019-04-18 PROBLEM — L73.9 FOLLICULITIS: Status: ACTIVE | Noted: 2019-04-18

## 2019-09-12 PROBLEM — I50.43 ACUTE ON CHRONIC COMBINED SYSTOLIC AND DIASTOLIC CONGESTIVE HEART FAILURE: Status: RESOLVED | Noted: 2019-03-25 | Resolved: 2019-01-01

## 2019-09-12 PROBLEM — I50.43 ACUTE ON CHRONIC COMBINED SYSTOLIC AND DIASTOLIC HEART FAILURE: Status: RESOLVED | Noted: 2019-01-19 | Resolved: 2019-01-01

## 2020-01-01 ENCOUNTER — OFFICE VISIT (OUTPATIENT)
Dept: URGENT CARE | Facility: CLINIC | Age: 75
End: 2020-01-01
Payer: MEDICARE

## 2020-01-01 ENCOUNTER — LAB VISIT (OUTPATIENT)
Dept: INFUSION THERAPY | Facility: HOSPITAL | Age: 75
End: 2020-01-01
Attending: PHYSICIAN ASSISTANT
Payer: MEDICARE

## 2020-01-01 VITALS — TEMPERATURE: 98 F | OXYGEN SATURATION: 97 % | RESPIRATION RATE: 14 BRPM | HEART RATE: 85 BPM

## 2020-01-01 DIAGNOSIS — Z01.818 PRE-OP TESTING: Primary | ICD-10-CM

## 2020-01-01 DIAGNOSIS — Z03.818 ENCNTR FOR OBS FOR SUSP EXPSR TO OTH BIOLG AGENTS RULED OUT: Primary | ICD-10-CM

## 2020-01-01 LAB
SARS-COV-2 RDRP RESP QL NAA+PROBE: NEGATIVE
SARS-COV-2 RNA RESP QL NAA+PROBE: NOT DETECTED

## 2020-01-01 PROCEDURE — U0002 COVID-19 LAB TEST NON-CDC: HCPCS | Mod: PN

## 2020-01-01 PROCEDURE — 99211 PR OFFICE/OUTPT VISIT, EST, LEVL I: ICD-10-PCS | Mod: S$GLB,,, | Performed by: NURSE PRACTITIONER

## 2020-01-01 PROCEDURE — U0003 INFECTIOUS AGENT DETECTION BY NUCLEIC ACID (DNA OR RNA); SEVERE ACUTE RESPIRATORY SYNDROME CORONAVIRUS 2 (SARS-COV-2) (CORONAVIRUS DISEASE [COVID-19]), AMPLIFIED PROBE TECHNIQUE, MAKING USE OF HIGH THROUGHPUT TECHNOLOGIES AS DESCRIBED BY CMS-2020-01-R: HCPCS

## 2020-01-01 PROCEDURE — U0002 COVID-19 LAB TEST NON-CDC: HCPCS

## 2020-01-01 PROCEDURE — 99211 OFF/OP EST MAY X REQ PHY/QHP: CPT | Mod: S$GLB,,, | Performed by: NURSE PRACTITIONER

## 2020-05-23 PROBLEM — J34.2 DEVIATED NASAL SEPTUM: Status: ACTIVE | Noted: 2020-01-01

## 2020-05-23 PROBLEM — J38.2 NODULES OF VOCAL CORDS: Status: ACTIVE | Noted: 2020-01-01

## 2020-05-23 PROBLEM — R49.0 HOARSENESS OF VOICE: Status: ACTIVE | Noted: 2020-01-01

## 2020-05-23 PROBLEM — H90.3 SENSORY HEARING LOSS, BILATERAL: Status: ACTIVE | Noted: 2020-01-01

## 2020-06-03 NOTE — PROGRESS NOTES
Pt present today for Covid Test . preop testing. Denies symptoms at this time.  Counseled patient and answered questions related to covid-19 testing and diagnosis.

## 2020-07-12 PROBLEM — J38.2 NODULES OF VOCAL CORDS: Status: RESOLVED | Noted: 2020-01-01 | Resolved: 2020-01-01

## 2020-07-12 PROBLEM — C32.0 SQUAMOUS CELL CARCINOMA OF RIGHT VOCAL CORD: Chronic | Status: RESOLVED | Noted: 2020-01-01 | Resolved: 2020-01-01

## 2020-08-09 PROBLEM — G93.41 ACUTE METABOLIC ENCEPHALOPATHY: Status: ACTIVE | Noted: 2020-01-01

## 2020-08-09 PROBLEM — R79.89 ELEVATED TROPONIN: Status: ACTIVE | Noted: 2020-01-01

## 2020-08-09 PROBLEM — C32.0 VOCAL CORD CANCER: Status: ACTIVE | Noted: 2020-01-01

## 2020-08-09 PROBLEM — E86.9 VOLUME DEPLETION: Status: ACTIVE | Noted: 2020-01-01

## 2020-08-09 PROBLEM — R79.9 ELEVATED BUN: Status: ACTIVE | Noted: 2020-01-01

## 2020-08-09 PROBLEM — R53.1 WEAKNESS: Status: ACTIVE | Noted: 2020-01-01

## 2020-08-09 PROBLEM — J18.9 PNEUMONIA OF LEFT LOWER LOBE DUE TO INFECTIOUS ORGANISM: Status: ACTIVE | Noted: 2020-01-01

## 2020-08-10 PROBLEM — I50.33 ACUTE ON CHRONIC CONGESTIVE HEART FAILURE WITH LEFT VENTRICULAR DIASTOLIC DYSFUNCTION: Status: ACTIVE | Noted: 2020-01-01

## 2020-08-11 PROBLEM — K61.2 ABSCESS OF ANAL AND RECTAL REGIONS: Status: ACTIVE | Noted: 2020-01-01

## 2020-08-15 PROBLEM — I50.43 ACUTE ON CHRONIC COMBINED SYSTOLIC AND DIASTOLIC HEART FAILURE: Status: ACTIVE | Noted: 2020-01-01
